# Patient Record
Sex: MALE | Race: WHITE | Employment: OTHER | URBAN - METROPOLITAN AREA
[De-identification: names, ages, dates, MRNs, and addresses within clinical notes are randomized per-mention and may not be internally consistent; named-entity substitution may affect disease eponyms.]

---

## 2017-03-17 RX ORDER — METOPROLOL TARTRATE 50 MG/1
TABLET, FILM COATED ORAL
Qty: 180 TABLET | Refills: 2 | Status: SHIPPED | OUTPATIENT
Start: 2017-03-17 | End: 2017-10-13 | Stop reason: SDUPTHER

## 2017-03-17 RX ORDER — LISINOPRIL 10 MG/1
TABLET ORAL
Qty: 90 TABLET | Refills: 2 | Status: SHIPPED | OUTPATIENT
Start: 2017-03-17 | End: 2018-04-13 | Stop reason: ALTCHOICE

## 2017-03-17 RX ORDER — CITALOPRAM 20 MG/1
TABLET ORAL
Qty: 90 TABLET | Refills: 1 | Status: SHIPPED | OUTPATIENT
Start: 2017-03-17 | End: 2017-09-26 | Stop reason: SDUPTHER

## 2017-03-20 RX ORDER — ATORVASTATIN CALCIUM 10 MG/1
10 TABLET, FILM COATED ORAL DAILY
Qty: 30 TABLET | Refills: 0 | Status: SHIPPED | OUTPATIENT
Start: 2017-03-20 | End: 2017-04-08 | Stop reason: SDUPTHER

## 2017-04-08 ENCOUNTER — OFFICE VISIT (OUTPATIENT)
Dept: FAMILY MEDICINE CLINIC | Age: 62
End: 2017-04-08

## 2017-04-08 VITALS
RESPIRATION RATE: 16 BRPM | SYSTOLIC BLOOD PRESSURE: 128 MMHG | HEART RATE: 68 BPM | WEIGHT: 217 LBS | DIASTOLIC BLOOD PRESSURE: 86 MMHG | BODY MASS INDEX: 29.39 KG/M2 | HEIGHT: 72 IN | TEMPERATURE: 98.9 F

## 2017-04-08 DIAGNOSIS — I10 ESSENTIAL HYPERTENSION: ICD-10-CM

## 2017-04-08 DIAGNOSIS — Z12.11 SCREENING FOR COLON CANCER: ICD-10-CM

## 2017-04-08 DIAGNOSIS — I71.21 THORACIC ASCENDING AORTIC ANEURYSM: ICD-10-CM

## 2017-04-08 DIAGNOSIS — Z00.00 HEALTHCARE MAINTENANCE: ICD-10-CM

## 2017-04-08 DIAGNOSIS — F41.1 GENERALIZED ANXIETY DISORDER: ICD-10-CM

## 2017-04-08 DIAGNOSIS — E78.2 MIXED HYPERLIPIDEMIA: ICD-10-CM

## 2017-04-08 DIAGNOSIS — I71.40 ABDOMINAL AORTIC ANEURYSM (AAA) WITHOUT RUPTURE: Primary | ICD-10-CM

## 2017-04-08 LAB
CONTROL: PRESENT
HEMOCCULT STL QL: NEGATIVE

## 2017-04-08 PROCEDURE — 99396 PREV VISIT EST AGE 40-64: CPT | Performed by: FAMILY MEDICINE

## 2017-04-08 PROCEDURE — 82274 ASSAY TEST FOR BLOOD FECAL: CPT | Performed by: FAMILY MEDICINE

## 2017-04-08 RX ORDER — ATORVASTATIN CALCIUM 10 MG/1
10 TABLET, FILM COATED ORAL DAILY
Qty: 90 TABLET | Refills: 1 | Status: SHIPPED | OUTPATIENT
Start: 2017-04-08 | End: 2017-10-13 | Stop reason: SDUPTHER

## 2017-04-08 RX ORDER — ALPRAZOLAM 0.5 MG/1
0.5 TABLET ORAL 2 TIMES DAILY PRN
Qty: 40 TABLET | Refills: 2 | Status: SHIPPED | OUTPATIENT
Start: 2017-04-08 | End: 2017-10-13 | Stop reason: SDUPTHER

## 2017-09-22 ENCOUNTER — HOSPITAL ENCOUNTER (OUTPATIENT)
Dept: CT IMAGING | Age: 62
Discharge: HOME | End: 2017-09-22
Payer: COMMERCIAL

## 2017-09-22 VITALS — DIASTOLIC BLOOD PRESSURE: 81 MMHG | HEART RATE: 54 BPM | SYSTOLIC BLOOD PRESSURE: 138 MMHG

## 2017-09-22 DIAGNOSIS — I71.21 THORACIC ASCENDING AORTIC ANEURYSM: ICD-10-CM

## 2017-09-22 DIAGNOSIS — I71.40 ABDOMINAL AORTIC ANEURYSM (AAA) WITHOUT RUPTURE: ICD-10-CM

## 2017-09-22 PROCEDURE — 71260 CT THORAX DX C+: CPT

## 2017-09-22 PROCEDURE — 6360000004 HC RX CONTRAST MEDICATION: Performed by: FAMILY MEDICINE

## 2017-09-22 PROCEDURE — 74177 CT ABD & PELVIS W/CONTRAST: CPT

## 2017-09-22 RX ADMIN — IOVERSOL 100 ML: 678 INJECTION INTRA-ARTERIAL; INTRAVENOUS at 08:40

## 2017-10-08 LAB
ALBUMIN SERPL-MCNC: 4.3 G/DL
ALP BLD-CCNC: 58 U/L
ALT SERPL-CCNC: 34 U/L
ANION GAP SERPL CALCULATED.3IONS-SCNC: NORMAL MMOL/L
AST SERPL-CCNC: 31 U/L
BILIRUB SERPL-MCNC: 1.1 MG/DL (ref 0.1–1.4)
BUN BLDV-MCNC: NORMAL MG/DL
CALCIUM SERPL-MCNC: 9.5 MG/DL
CHLORIDE BLD-SCNC: 105 MMOL/L
CHOLESTEROL, TOTAL: 120 MG/DL
CHOLESTEROL/HDL RATIO: 2.5
CO2: 33 MMOL/L
CREAT SERPL-MCNC: 1.12 MG/DL
GFR CALCULATED: NORMAL
GLUCOSE BLD-MCNC: 103 MG/DL
HDLC SERPL-MCNC: 48 MG/DL (ref 35–70)
LDL CHOLESTEROL CALCULATED: 54 MG/DL (ref 0–160)
POTASSIUM SERPL-SCNC: 4.7 MMOL/L
SODIUM BLD-SCNC: 142 MMOL/L
TOTAL PROTEIN: 6.9
TRIGL SERPL-MCNC: 93 MG/DL
VLDLC SERPL CALC-MCNC: NORMAL MG/DL

## 2017-10-09 DIAGNOSIS — E78.2 MIXED HYPERLIPIDEMIA: ICD-10-CM

## 2017-10-09 DIAGNOSIS — I10 ESSENTIAL HYPERTENSION: ICD-10-CM

## 2017-10-13 ENCOUNTER — OFFICE VISIT (OUTPATIENT)
Dept: FAMILY MEDICINE CLINIC | Age: 62
End: 2017-10-13

## 2017-10-13 VITALS
DIASTOLIC BLOOD PRESSURE: 82 MMHG | TEMPERATURE: 97.6 F | SYSTOLIC BLOOD PRESSURE: 136 MMHG | HEART RATE: 64 BPM | WEIGHT: 213.6 LBS | BODY MASS INDEX: 28.97 KG/M2 | RESPIRATION RATE: 14 BRPM

## 2017-10-13 DIAGNOSIS — M77.52 TENDINITIS OF LEFT FOOT: ICD-10-CM

## 2017-10-13 DIAGNOSIS — E78.2 MIXED HYPERLIPIDEMIA: ICD-10-CM

## 2017-10-13 DIAGNOSIS — I71.40 ABDOMINAL AORTIC ANEURYSM (AAA) WITHOUT RUPTURE: ICD-10-CM

## 2017-10-13 DIAGNOSIS — Z23 NEED FOR PROPHYLACTIC VACCINATION AGAINST DIPHTHERIA-TETANUS-PERTUSSIS (DTP): ICD-10-CM

## 2017-10-13 DIAGNOSIS — Z13.1 ENCOUNTER FOR SCREENING FOR DIABETES MELLITUS: ICD-10-CM

## 2017-10-13 DIAGNOSIS — I10 ESSENTIAL HYPERTENSION: Primary | ICD-10-CM

## 2017-10-13 DIAGNOSIS — I71.21 THORACIC ASCENDING AORTIC ANEURYSM: ICD-10-CM

## 2017-10-13 PROCEDURE — 90471 IMMUNIZATION ADMIN: CPT | Performed by: FAMILY MEDICINE

## 2017-10-13 PROCEDURE — 99214 OFFICE O/P EST MOD 30 MIN: CPT | Performed by: FAMILY MEDICINE

## 2017-10-13 PROCEDURE — 90715 TDAP VACCINE 7 YRS/> IM: CPT | Performed by: FAMILY MEDICINE

## 2017-10-13 PROCEDURE — 1036F TOBACCO NON-USER: CPT | Performed by: FAMILY MEDICINE

## 2017-10-13 PROCEDURE — 3017F COLORECTAL CA SCREEN DOC REV: CPT | Performed by: FAMILY MEDICINE

## 2017-10-13 PROCEDURE — G8427 DOCREV CUR MEDS BY ELIG CLIN: HCPCS | Performed by: FAMILY MEDICINE

## 2017-10-13 PROCEDURE — G8484 FLU IMMUNIZE NO ADMIN: HCPCS | Performed by: FAMILY MEDICINE

## 2017-10-13 PROCEDURE — G8419 CALC BMI OUT NRM PARAM NOF/U: HCPCS | Performed by: FAMILY MEDICINE

## 2017-10-13 RX ORDER — ATORVASTATIN CALCIUM 10 MG/1
10 TABLET, FILM COATED ORAL DAILY
Qty: 90 TABLET | Refills: 1 | Status: SHIPPED | OUTPATIENT
Start: 2017-10-13 | End: 2018-04-13 | Stop reason: SDUPTHER

## 2017-10-13 RX ORDER — LISINOPRIL AND HYDROCHLOROTHIAZIDE 12.5; 1 MG/1; MG/1
1 TABLET ORAL DAILY
Qty: 90 TABLET | Refills: 1 | Status: SHIPPED | OUTPATIENT
Start: 2017-10-13 | End: 2018-04-13 | Stop reason: SDUPTHER

## 2017-10-13 RX ORDER — LISINOPRIL 10 MG/1
TABLET ORAL
Qty: 90 TABLET | Refills: 2 | Status: CANCELLED | OUTPATIENT
Start: 2017-10-13

## 2017-10-13 RX ORDER — METOPROLOL TARTRATE 50 MG/1
TABLET, FILM COATED ORAL
Qty: 180 TABLET | Refills: 1 | Status: SHIPPED | OUTPATIENT
Start: 2017-10-13 | End: 2018-04-13 | Stop reason: SDUPTHER

## 2017-10-13 RX ORDER — ALPRAZOLAM 0.5 MG/1
0.5 TABLET ORAL 2 TIMES DAILY PRN
Qty: 40 TABLET | Refills: 2 | Status: SHIPPED | OUTPATIENT
Start: 2017-10-13 | End: 2018-04-13 | Stop reason: ALTCHOICE

## 2017-10-13 ASSESSMENT — PATIENT HEALTH QUESTIONNAIRE - PHQ9
1. LITTLE INTEREST OR PLEASURE IN DOING THINGS: 0
SUM OF ALL RESPONSES TO PHQ9 QUESTIONS 1 & 2: 0
2. FEELING DOWN, DEPRESSED OR HOPELESS: 0
SUM OF ALL RESPONSES TO PHQ QUESTIONS 1-9: 0

## 2017-10-13 NOTE — PATIENT INSTRUCTIONS
Patient Education        High Blood Pressure: Care Instructions  Your Care Instructions  If your blood pressure is usually above 140/90, you have high blood pressure, or hypertension. That means the top number is 140 or higher or the bottom number is 90 or higher, or both. Despite what a lot of people think, high blood pressure usually doesn't cause headaches or make you feel dizzy or lightheaded. It usually has no symptoms. But it does increase your risk for heart attack, stroke, and kidney or eye damage. The higher your blood pressure, the more your risk increases. Your doctor will give you a goal for your blood pressure. Your goal will be based on your health and your age. An example of a goal is to keep your blood pressure below 140/90. Lifestyle changes, such as eating healthy and being active, are always important to help lower blood pressure. You might also take medicine to reach your blood pressure goal.  Follow-up care is a key part of your treatment and safety. Be sure to make and go to all appointments, and call your doctor if you are having problems. It's also a good idea to know your test results and keep a list of the medicines you take. How can you care for yourself at home? Medical treatment  · If you stop taking your medicine, your blood pressure will go back up. You may take one or more types of medicine to lower your blood pressure. Be safe with medicines. Take your medicine exactly as prescribed. Call your doctor if you think you are having a problem with your medicine. · Talk to your doctor before you start taking aspirin every day. Aspirin can help certain people lower their risk of a heart attack or stroke. But taking aspirin isn't right for everyone, because it can cause serious bleeding. · See your doctor regularly. You may need to see the doctor more often at first or until your blood pressure comes down.   · If you are taking blood pressure medicine, talk to your doctor before you

## 2017-10-16 ENCOUNTER — TELEPHONE (OUTPATIENT)
Dept: FAMILY MEDICINE CLINIC | Age: 62
End: 2017-10-16

## 2017-10-16 NOTE — TELEPHONE ENCOUNTER
YOU HAD GIVEN THIS PATIENT A REFERRAL FOR CARDIO AND HE WANTED TO KNOW HOW SOON DOES HE NEED TO FOLLOW UP ON THIS? HE WOULD LIKE TO GO TO [facility] IF HE HAS TO DO THIS SOON.  [address] [place]    [phone] (home)

## 2017-11-27 ENCOUNTER — TELEPHONE (OUTPATIENT)
Dept: FAMILY MEDICINE CLINIC | Age: 62
End: 2017-11-27

## 2018-04-07 LAB
ALBUMIN SERPL-MCNC: 4.3 G/DL
ALP BLD-CCNC: 58 U/L
ALT SERPL-CCNC: 31 U/L
ANION GAP SERPL CALCULATED.3IONS-SCNC: NORMAL MMOL/L
AST SERPL-CCNC: 29 U/L
BASOPHILS ABSOLUTE: 0 /ΜL
BASOPHILS RELATIVE PERCENT: 0 %
BILIRUB SERPL-MCNC: 1.1 MG/DL (ref 0.1–1.4)
BUN BLDV-MCNC: 17 MG/DL
CALCIUM SERPL-MCNC: 9.6 MG/DL
CHLORIDE BLD-SCNC: 101 MMOL/L
CHOLESTEROL, TOTAL: 136 MG/DL
CHOLESTEROL/HDL RATIO: 3.1
CO2: 35 MMOL/L
CREAT SERPL-MCNC: 0.89 MG/DL
EOSINOPHILS ABSOLUTE: 100 /ΜL
EOSINOPHILS RELATIVE PERCENT: 2 %
GFR CALCULATED: 92
GLUCOSE BLD-MCNC: 105 MG/DL
HCT VFR BLD CALC: 48.8 % (ref 41–53)
HDLC SERPL-MCNC: 44 MG/DL (ref 35–70)
HEMOGLOBIN: 16.1 G/DL (ref 13.5–17.5)
LDL CHOLESTEROL CALCULATED: 71 MG/DL (ref 0–160)
LYMPHOCYTES ABSOLUTE: 1500 /ΜL
LYMPHOCYTES RELATIVE PERCENT: 21 %
MCH RBC QN AUTO: 30.1 PG
MCHC RBC AUTO-ENTMCNC: 32.9 G/DL
MCV RBC AUTO: 91.5 FL
MONOCYTES ABSOLUTE: 500 /ΜL
MONOCYTES RELATIVE PERCENT: 7 %
NEUTROPHILS ABSOLUTE: 5000 /ΜL
NEUTROPHILS RELATIVE PERCENT: 70 %
PDW BLD-RTO: 14.9 %
PLATELET # BLD: 158 K/ΜL
PMV BLD AUTO: 10.1 FL
POTASSIUM SERPL-SCNC: 4.3 MMOL/L
RBC # BLD: 5.33 10^6/ΜL
SODIUM BLD-SCNC: 139 MMOL/L
TOTAL PROTEIN: 6.9
TRIGL SERPL-MCNC: 120 MG/DL
VLDLC SERPL CALC-MCNC: NORMAL MG/DL
WBC # BLD: 7.1 10^3/ML

## 2018-04-09 DIAGNOSIS — I10 ESSENTIAL HYPERTENSION: ICD-10-CM

## 2018-04-09 DIAGNOSIS — Z13.1 ENCOUNTER FOR SCREENING FOR DIABETES MELLITUS: ICD-10-CM

## 2018-04-10 LAB — GLUCOSE FASTING: 105 MG/DL

## 2018-04-13 ENCOUNTER — OFFICE VISIT (OUTPATIENT)
Dept: FAMILY MEDICINE CLINIC | Age: 63
End: 2018-04-13
Payer: COMMERCIAL

## 2018-04-13 VITALS
BODY MASS INDEX: 27.55 KG/M2 | WEIGHT: 203.4 LBS | HEIGHT: 72 IN | RESPIRATION RATE: 18 BRPM | HEART RATE: 76 BPM | DIASTOLIC BLOOD PRESSURE: 66 MMHG | SYSTOLIC BLOOD PRESSURE: 118 MMHG | TEMPERATURE: 97.7 F

## 2018-04-13 DIAGNOSIS — E78.2 MIXED HYPERLIPIDEMIA: ICD-10-CM

## 2018-04-13 DIAGNOSIS — I10 ESSENTIAL HYPERTENSION: ICD-10-CM

## 2018-04-13 DIAGNOSIS — F41.1 GENERALIZED ANXIETY DISORDER: ICD-10-CM

## 2018-04-13 DIAGNOSIS — Z12.11 COLON CANCER SCREENING: ICD-10-CM

## 2018-04-13 DIAGNOSIS — Z00.00 HEALTHCARE MAINTENANCE: Primary | ICD-10-CM

## 2018-04-13 PROCEDURE — 99396 PREV VISIT EST AGE 40-64: CPT | Performed by: FAMILY MEDICINE

## 2018-04-13 RX ORDER — ATORVASTATIN CALCIUM 10 MG/1
10 TABLET, FILM COATED ORAL DAILY
Qty: 90 TABLET | Refills: 1 | Status: SHIPPED | OUTPATIENT
Start: 2018-04-13 | End: 2018-10-10 | Stop reason: SDUPTHER

## 2018-04-13 RX ORDER — CITALOPRAM 20 MG/1
20 TABLET ORAL DAILY
Qty: 90 TABLET | Refills: 1 | Status: SHIPPED | OUTPATIENT
Start: 2018-04-13 | End: 2018-10-06 | Stop reason: SDUPTHER

## 2018-04-13 RX ORDER — LISINOPRIL AND HYDROCHLOROTHIAZIDE 12.5; 1 MG/1; MG/1
1 TABLET ORAL DAILY
Qty: 90 TABLET | Refills: 1 | Status: SHIPPED | OUTPATIENT
Start: 2018-04-13 | End: 2018-10-06 | Stop reason: SDUPTHER

## 2018-04-13 RX ORDER — METOPROLOL TARTRATE 50 MG/1
TABLET, FILM COATED ORAL
Qty: 180 TABLET | Refills: 1 | Status: SHIPPED | OUTPATIENT
Start: 2018-04-13 | End: 2018-10-19 | Stop reason: SDUPTHER

## 2018-04-13 ASSESSMENT — PATIENT HEALTH QUESTIONNAIRE - PHQ9
SUM OF ALL RESPONSES TO PHQ QUESTIONS 1-9: 0
1. LITTLE INTEREST OR PLEASURE IN DOING THINGS: 0
2. FEELING DOWN, DEPRESSED OR HOPELESS: 0
SUM OF ALL RESPONSES TO PHQ9 QUESTIONS 1 & 2: 0

## 2018-08-30 ENCOUNTER — OFFICE VISIT (OUTPATIENT)
Dept: FAMILY MEDICINE CLINIC | Age: 63
End: 2018-08-30
Payer: COMMERCIAL

## 2018-08-30 VITALS
HEART RATE: 60 BPM | WEIGHT: 203 LBS | SYSTOLIC BLOOD PRESSURE: 124 MMHG | TEMPERATURE: 97.2 F | HEIGHT: 72 IN | BODY MASS INDEX: 27.5 KG/M2 | RESPIRATION RATE: 12 BRPM | DIASTOLIC BLOOD PRESSURE: 74 MMHG

## 2018-08-30 DIAGNOSIS — F41.1 GENERALIZED ANXIETY DISORDER: Primary | ICD-10-CM

## 2018-08-30 DIAGNOSIS — I10 ESSENTIAL HYPERTENSION: ICD-10-CM

## 2018-08-30 DIAGNOSIS — M72.2 PLANTAR FASCIITIS, LEFT: ICD-10-CM

## 2018-08-30 PROCEDURE — 1036F TOBACCO NON-USER: CPT | Performed by: FAMILY MEDICINE

## 2018-08-30 PROCEDURE — G8419 CALC BMI OUT NRM PARAM NOF/U: HCPCS | Performed by: FAMILY MEDICINE

## 2018-08-30 PROCEDURE — 99213 OFFICE O/P EST LOW 20 MIN: CPT | Performed by: FAMILY MEDICINE

## 2018-08-30 PROCEDURE — G8427 DOCREV CUR MEDS BY ELIG CLIN: HCPCS | Performed by: FAMILY MEDICINE

## 2018-08-30 PROCEDURE — 3017F COLORECTAL CA SCREEN DOC REV: CPT | Performed by: FAMILY MEDICINE

## 2018-08-30 RX ORDER — ALPRAZOLAM 0.5 MG/1
0.5 TABLET ORAL 2 TIMES DAILY PRN
Qty: 40 TABLET | Refills: 2 | Status: SHIPPED | OUTPATIENT
Start: 2018-08-30 | End: 2018-09-29

## 2018-08-30 ASSESSMENT — PATIENT HEALTH QUESTIONNAIRE - PHQ9
SUM OF ALL RESPONSES TO PHQ QUESTIONS 1-9: 0
2. FEELING DOWN, DEPRESSED OR HOPELESS: 0
SUM OF ALL RESPONSES TO PHQ9 QUESTIONS 1 & 2: 0
SUM OF ALL RESPONSES TO PHQ QUESTIONS 1-9: 0
1. LITTLE INTEREST OR PLEASURE IN DOING THINGS: 0

## 2018-08-30 NOTE — PROGRESS NOTES
fatigue, night sweats or pallor  Endocrine ROS: negative for - hot flashes, malaise/lethargy, palpitations, polydipsia/polyuria, skin changes, temperature intolerance or unexpected weight changes  Respiratory ROS: negative for - cough, hemoptysis, pleuritic pain, shortness of breath or wheezing  Cardiovascular ROS: no chest pain or dyspnea on exertion  Gastrointestinal ROS: no abdominal pain, change in bowel habits, or black or bloody stools  Genito-Urinary ROS: no dysuria, trouble voiding, or hematuria  Dermatological ROS: negative for - dry skin, lumps, pruritus or rash     Blood pressure 124/74, pulse 60, temperature 97.2 °F (36.2 °C), temperature source Temporal, resp. rate 12, height 6' (1.829 m), weight 203 lb (92.1 kg). Physical Examination: General appearance - alert, well appearing, and in no distress  Mental status - alert, oriented to person, place, and time  Eyes - pupils equal and reactive, extraocular eye movements intact  Ears - bilateral TM's and external ear canals normal  Mouth - mucous membranes moist, pharynx normal without lesions  Neck - supple, no significant adenopathy  Lymphatics - no palpable lymphadenopathy, no hepatosplenomegaly  Chest - clear to auscultation, no wheezes, rales or rhonchi, symmetric air entry  Heart - normal rate, regular rhythm, normal S1, S2, no murmurs, rubs, clicks or gallops  Abdomen - soft, nontender, nondistended, no masses or organomegaly  Neurological - alert, oriented, normal speech, no focal findings or movement disorder noted  Exam shows tenderness to the inferomedial aspect of the heel left. Office Visit on 04/13/2018   Component Date Value Ref Range Status    Glucose, Fasting 04/10/2018 105  mg/dL Final       Test results reviewed and discussed with the patient     Assessment / Plan:     Encounter Diagnoses   Name Primary?     Generalized anxiety disorder Yes    Essential hypertension     Plantar fasciitis, left        Outpatient Encounter

## 2018-09-08 LAB
ALBUMIN SERPL-MCNC: 4.1 G/DL
ALP BLD-CCNC: 58 U/L
ALT SERPL-CCNC: 36 U/L
ANION GAP SERPL CALCULATED.3IONS-SCNC: NORMAL MMOL/L
AST SERPL-CCNC: 32 U/L
BILIRUB SERPL-MCNC: 0.9 MG/DL (ref 0.1–1.4)
BUN BLDV-MCNC: NORMAL MG/DL
CALCIUM SERPL-MCNC: 9.3 MG/DL
CHLORIDE BLD-SCNC: 104 MMOL/L
CHOLESTEROL, TOTAL: 123 MG/DL
CHOLESTEROL/HDL RATIO: 2.8
CO2: 31 MMOL/L
CREAT SERPL-MCNC: 1.04 MG/DL
GFR CALCULATED: NORMAL
GLUCOSE BLD-MCNC: 89 MG/DL
HDLC SERPL-MCNC: 44 MG/DL (ref 35–70)
LDL CHOLESTEROL CALCULATED: 62 MG/DL (ref 0–160)
POTASSIUM SERPL-SCNC: 4.1 MMOL/L
SODIUM BLD-SCNC: 140 MMOL/L
TOTAL PROTEIN: 6.6
TRIGL SERPL-MCNC: 83 MG/DL
VLDLC SERPL CALC-MCNC: NORMAL MG/DL

## 2018-09-10 DIAGNOSIS — Z00.00 HEALTHCARE MAINTENANCE: ICD-10-CM

## 2018-10-10 DIAGNOSIS — F41.1 GENERALIZED ANXIETY DISORDER: ICD-10-CM

## 2018-10-10 RX ORDER — ATORVASTATIN CALCIUM 10 MG/1
10 TABLET, FILM COATED ORAL DAILY
Qty: 90 TABLET | Refills: 1 | Status: SHIPPED | OUTPATIENT
Start: 2018-10-10

## 2018-10-10 RX ORDER — CITALOPRAM 20 MG/1
20 TABLET ORAL DAILY
Qty: 90 TABLET | Refills: 0 | Status: SHIPPED | OUTPATIENT
Start: 2018-10-10 | End: 2018-10-19 | Stop reason: SDUPTHER

## 2018-10-10 RX ORDER — LISINOPRIL AND HYDROCHLOROTHIAZIDE 12.5; 1 MG/1; MG/1
1 TABLET ORAL DAILY
Qty: 90 TABLET | Refills: 0 | Status: SHIPPED | OUTPATIENT
Start: 2018-10-10 | End: 2018-10-19 | Stop reason: SDUPTHER

## 2018-10-10 NOTE — TELEPHONE ENCOUNTER
PATIENT CALLED REQUESTING REFILL.  PLEASE ADVISE     HE STATES HE IS SWITCHING HIS MEDICATIONS TO [facility]    LAST SEEN 8/30/18

## 2018-10-19 ENCOUNTER — OFFICE VISIT (OUTPATIENT)
Dept: FAMILY MEDICINE CLINIC | Age: 63
End: 2018-10-19
Payer: COMMERCIAL

## 2018-10-19 VITALS
HEIGHT: 72 IN | TEMPERATURE: 97.1 F | SYSTOLIC BLOOD PRESSURE: 124 MMHG | HEART RATE: 68 BPM | RESPIRATION RATE: 18 BRPM | BODY MASS INDEX: 27.82 KG/M2 | DIASTOLIC BLOOD PRESSURE: 76 MMHG | WEIGHT: 205.4 LBS

## 2018-10-19 DIAGNOSIS — Z95.828 HISTORY OF ENDOVASCULAR STENT GRAFT FOR ABDOMINAL AORTIC ANEURYSM (AAA): ICD-10-CM

## 2018-10-19 DIAGNOSIS — F41.1 GENERALIZED ANXIETY DISORDER: ICD-10-CM

## 2018-10-19 DIAGNOSIS — E78.2 MIXED HYPERLIPIDEMIA: ICD-10-CM

## 2018-10-19 DIAGNOSIS — I10 ESSENTIAL HYPERTENSION: Primary | ICD-10-CM

## 2018-10-19 PROCEDURE — G8427 DOCREV CUR MEDS BY ELIG CLIN: HCPCS | Performed by: FAMILY MEDICINE

## 2018-10-19 PROCEDURE — G8484 FLU IMMUNIZE NO ADMIN: HCPCS | Performed by: FAMILY MEDICINE

## 2018-10-19 PROCEDURE — G8419 CALC BMI OUT NRM PARAM NOF/U: HCPCS | Performed by: FAMILY MEDICINE

## 2018-10-19 PROCEDURE — 1036F TOBACCO NON-USER: CPT | Performed by: FAMILY MEDICINE

## 2018-10-19 PROCEDURE — 99214 OFFICE O/P EST MOD 30 MIN: CPT | Performed by: FAMILY MEDICINE

## 2018-10-19 PROCEDURE — 3017F COLORECTAL CA SCREEN DOC REV: CPT | Performed by: FAMILY MEDICINE

## 2018-10-19 RX ORDER — LISINOPRIL AND HYDROCHLOROTHIAZIDE 12.5; 1 MG/1; MG/1
1 TABLET ORAL DAILY
Qty: 90 TABLET | Refills: 1 | Status: SHIPPED | OUTPATIENT
Start: 2018-10-19

## 2018-10-19 RX ORDER — CITALOPRAM 20 MG/1
20 TABLET ORAL DAILY
Qty: 90 TABLET | Refills: 1 | Status: SHIPPED | OUTPATIENT
Start: 2018-10-19

## 2018-10-19 RX ORDER — METOPROLOL TARTRATE 50 MG/1
TABLET, FILM COATED ORAL
Qty: 180 TABLET | Refills: 1 | Status: SHIPPED | OUTPATIENT
Start: 2018-10-19

## 2018-10-19 RX ORDER — CLOPIDOGREL BISULFATE 75 MG/1
75 TABLET ORAL DAILY
COMMUNITY

## 2018-10-19 ASSESSMENT — PATIENT HEALTH QUESTIONNAIRE - PHQ9
SUM OF ALL RESPONSES TO PHQ QUESTIONS 1-9: 0
SUM OF ALL RESPONSES TO PHQ QUESTIONS 1-9: 0
2. FEELING DOWN, DEPRESSED OR HOPELESS: 0
SUM OF ALL RESPONSES TO PHQ9 QUESTIONS 1 & 2: 0
1. LITTLE INTEREST OR PLEASURE IN DOING THINGS: 0

## 2018-10-19 NOTE — PROGRESS NOTES
facility-administered medications for this visit. No Known Allergies    Social History   Substance Use Topics    Smoking status: Never Smoker    Smokeless tobacco: Never Used    Alcohol use 1.5 oz/week     3 drink(s) per week       Review of Systems - General ROS: negative for - fatigue, fever, malaise, night sweats, sleep disturbance or weight loss  Psychological ROS: negative for - anxiety, concentration difficulties, depression, memory difficulties or sleep disturbances  ENT ROS: negative for - hearing change, nasal discharge, oral lesions, sinus pain, sore throat, tinnitus or vertigo  Allergy and Immunology ROS: negative for - hives, nasal congestion or seasonal allergies  Hematological and Lymphatic ROS: negative for - bruising, fatigue, night sweats or pallor  Endocrine ROS: negative for - hot flashes, malaise/lethargy, palpitations, polydipsia/polyuria, skin changes, temperature intolerance or unexpected weight changes  Respiratory ROS: negative for - cough, hemoptysis, pleuritic pain, shortness of breath or wheezing  Cardiovascular ROS: no chest pain or dyspnea on exertion  Gastrointestinal ROS: no abdominal pain, change in bowel habits, or black or bloody stools  Genito-Urinary ROS: no dysuria, trouble voiding, or hematuria  Musculoskeletal ROS: negative for - joint pain, joint stiffness, joint swelling, muscle pain or muscular weakness  Neurological ROS: negative for - dizziness, gait disturbance, headaches, impaired coordination/balance, numbness/tingling, tremors or visual changes  Dermatological ROS: negative for - dry skin, lumps, pruritus or rash            Blood pressure 124/76, pulse 68, temperature 97.1 °F (36.2 °C), temperature source Temporal, resp. rate 18, height 6' (1.829 m), weight 205 lb 6.4 oz (93.2 kg).     Physical Examination: General appearance - alert, well appearing, and in no distress  Mental status - alert, oriented to person, place, and time  Eyes - pupils equal and reactive, extraocular eye movements intact  Ears - bilateral TM's and external ear canals normal  Mouth - mucous membranes moist, pharynx normal without lesions  Neck - supple, no significant adenopathy  Lymphatics - no palpable lymphadenopathy, no hepatosplenomegaly  Chest - clear to auscultation, no wheezes, rales or rhonchi, symmetric air entry  Heart - normal rate, regular rhythm, normal S1, S2, no murmurs, rubs, clicks or gallops  Abdomen - soft, nontender, nondistended, no masses or organomegaly  Neurological - alert, oriented, normal speech, no focal findings or movement disorder noted  Musculoskeletal - no joint tenderness, deformity or swelling        Orders Only on 09/14/2018   Component Date Value Ref Range Status    WBC 09/28/2018 6.2  4.2 - 11.0 10*3/uL Final    RBC 09/28/2018 5.32  4.08 - 6.37 10*6/uL Final    Hemoglobin 09/28/2018 16.0  12.8 - 17.7 g/dL Final    Hematocrit 09/28/2018 47.6  38.4 - 54.9 % Final    MCV 09/28/2018 89.5  83.3 - 98.2 fL Final    MCH 09/28/2018 30.1  27.5 - 32.9 pg Final    MCHC 09/28/2018 33.6  30.5 - 35.4 g/dL Final    RDW-CV 09/28/2018 13.4  12.0 - 15.4 % Final    RDW-SD 09/28/2018 43.9  39.3 - 48.6 fL Final    Platelets 94/04/5460 146* 155 - 404 10*3/uL Final    MPV 09/28/2018 10.6  9.9 - 12.1 fL Final    Nucleated RBCs 09/28/2018 0.0  /100[WBCs] Final    RBCs Counted 09/28/2018 0.00  10*3/uL Final    Glucose 09/28/2018 91  70 - 100 mg/dL Final    Urea Nitrogen 09/28/2018 18  6 - 23 mg/dL Final    CREATININE 09/28/2018 1.08  0.50 - 1.30 mg/dL Final    Gfr Calculated 09/28/2018 >60   Final    Sodium 09/28/2018 139  136 - 145 mmol/L Final    Potassium 09/28/2018 4.7  3.5 - 5.1 mmol/L Final    Chloride 09/28/2018 102  98 - 107 mmol/L Final    HCO3 09/28/2018 33* 21 - 32 mmol/L Final    Calcium 09/28/2018 9.9  8.6 - 10.3 mg/dL Final    Anion Gap 09/28/2018 9* 10 - 20 mmol/L Final    BUN/Creatinine Ratio 09/28/2018 17  5 - 25 Final    WBC 10/12/2018

## 2018-12-14 DIAGNOSIS — Z12.11 COLON CANCER SCREENING: ICD-10-CM

## 2018-12-14 LAB
CONTROL: NORMAL
HEMOCCULT STL QL: NEGATIVE

## 2018-12-14 PROCEDURE — 82274 ASSAY TEST FOR BLOOD FECAL: CPT | Performed by: FAMILY MEDICINE

## 2019-03-07 ENCOUNTER — TELEPHONE (OUTPATIENT)
Dept: FAMILY MEDICINE CLINIC | Age: 64
End: 2019-03-07

## 2019-04-17 LAB
ALBUMIN SERPL-MCNC: 4.1 G/DL
ALP BLD-CCNC: 54 U/L
ALT SERPL-CCNC: 32 U/L
ANION GAP SERPL CALCULATED.3IONS-SCNC: NORMAL MMOL/L
AST SERPL-CCNC: 28 U/L
BILIRUB SERPL-MCNC: 1 MG/DL (ref 0.1–1.4)
BUN BLDV-MCNC: 22 MG/DL
CALCIUM SERPL-MCNC: 9.4 MG/DL
CHLORIDE BLD-SCNC: 102 MMOL/L
CHOLESTEROL, TOTAL: 111 MG/DL
CHOLESTEROL/HDL RATIO: 2.7
CO2: 31 MMOL/L
CREAT SERPL-MCNC: 1.06 MG/DL
GFR CALCULATED: NORMAL
GLUCOSE BLD-MCNC: 81 MG/DL
HDLC SERPL-MCNC: 41 MG/DL (ref 35–70)
LDL CHOLESTEROL CALCULATED: 45 MG/DL (ref 0–160)
POTASSIUM SERPL-SCNC: 4.1 MMOL/L
SODIUM BLD-SCNC: 139 MMOL/L
TOTAL PROTEIN: 6.5
TRIGL SERPL-MCNC: 167 MG/DL
VLDLC SERPL CALC-MCNC: NORMAL MG/DL

## 2019-04-24 DIAGNOSIS — E78.2 MIXED HYPERLIPIDEMIA: ICD-10-CM

## 2019-04-24 DIAGNOSIS — I10 ESSENTIAL HYPERTENSION: ICD-10-CM

## 2025-05-11 NOTE — PROGRESS NOTES
hematuria  Musculoskeletal ROS: negative for - joint pain, joint stiffness, joint swelling, muscle pain or muscular weakness  Neurological ROS: negative for - dizziness, gait disturbance, headaches, impaired coordination/balance, numbness/tingling, tremors or visual changes  Dermatological ROS: negative for - dry skin, lumps, pruritus or rash            Blood pressure 136/82, pulse 64, temperature 97.6 °F (36.4 °C), temperature source Temporal, resp. rate 14, weight 213 lb 9.6 oz (96.9 kg).     Physical Examination: General appearance - alert, well appearing, and in no distress  Mental status - alert, oriented to person, place, and time  Eyes - pupils equal and reactive, extraocular eye movements intact  Ears - bilateral TM's and external ear canals normal  Mouth - mucous membranes moist, pharynx normal without lesions  Neck - supple, no significant adenopathy  Lymphatics - no palpable lymphadenopathy, no hepatosplenomegaly  Chest - clear to auscultation, no wheezes, rales or rhonchi, symmetric air entry  Heart - normal rate, regular rhythm, normal S1, S2, no murmurs, rubs, clicks or gallops  Abdomen - soft, nontender, nondistended, no masses or organomegaly  Neurological - alert, oriented, normal speech, no focal findings or movement disorder noted  Musculoskeletal - as noted in HPI           Orders Only on 10/09/2017   Component Date Value Ref Range Status    Sodium 10/08/2017 142  mmol/L Final    Chloride 10/08/2017 105  mmol/L Final    Potassium 10/08/2017 4.7  mmol/L Final    CREATININE 10/08/2017 1.12   Final    Glucose 10/08/2017 103  mg/dL Final    AST 10/08/2017 31  U/L Final    ALT 10/08/2017 34  U/L Final    Calcium 10/08/2017 9.5  mg/dL Final    Total Protein 10/08/2017 6.9   Final    CO2 10/08/2017 33  mmol/L Final    Alb 10/08/2017 4.3   Final    Alkaline Phosphatase 10/08/2017 58  U/L Final    Total Bilirubin 10/08/2017 1.1  0.1 - 1.4 mg/dL Final    Cholesterol, Total 10/08/2017 120 mg/dL Final    HDL 10/08/2017 48  35 - 70 mg/dL Final    LDL Calculated 10/08/2017 54  0 - 160 mg/dL Final    Triglycerides 10/08/2017 93  mg/dL Final    Chol/HDL Ratio 10/08/2017 2.5   Final            Encounter Diagnoses   Name Primary?     Encounter for screening for diabetes mellitus     Need for prophylactic vaccination against diphtheria-tetanus-pertussis (DTP)     Abdominal aortic aneurysm (AAA) without rupture ([facility] [address]     Thoracic ascending aortic aneurysm (HCC)     Mixed hyperlipidemia     Essential hypertension Yes    Tendinitis of left foot             Plan:     Orders Placed This Encounter   Procedures    Tdap (age 10y-63y) IM (Adacel)    Glucose, Fasting     Standing Status:   Future     Standing Expiration Date:   10/13/2018    Comprehensive Metabolic Panel     Standing Status:   Future     Standing Expiration Date:   10/13/2018    Lipid Panel     Standing Status:   Future     Standing Expiration Date:   10/13/2018     Order Specific Question:   Is Patient Fasting?/# of Hours     Answer:   yes 9-12 hrs    CBC With Auto Differential     Standing Status:   Future     Standing Expiration Date:   10/13/2018    Ambulatory referral to Cardiology     Referral Priority:   Routine     Referral Type:   Consult for Advice and Opinion     Referral Reason:   Specialty Services Required     Referred to Provider:   Anita Ferguson DO     Requested Specialty:   Cardiology     Number of Visits Requested:   1    Amb External Referral To Podiatry     Referral Priority:   Routine     Referral Type:   Consult for Advice and Opinion     Referral Reason:   Specialty Services Required     Requested Specialty:   Podiatry     Number of Visits Requested:   1     Outpatient Encounter Prescriptions as of 10/13/2017   Medication Sig Dispense Refill    atorvastatin (LIPITOR) 10 MG tablet Take 1 tablet by mouth daily 90 tablet 1    ALPRAZolam (XANAX) 0.5 MG tablet Take 1 tablet by mouth 2 times daily as needed for Sleep or Anxiety 40 tablet 2    metoprolol tartrate (LOPRESSOR) 50 MG tablet Take 1 tablet by mouth 2 times daily 180 tablet 1    lisinopril-hydrochlorothiazide (PRINZIDE;ZESTORETIC) 10-12.5 MG per tablet Take 1 tablet by mouth daily 90 tablet 1    citalopram (CELEXA) 20 MG tablet Take 1 tablet by mouth daily 90 tablet 1    [DISCONTINUED] atorvastatin (LIPITOR) 10 MG tablet Take 1 tablet by mouth daily 90 tablet 1    [DISCONTINUED] ALPRAZolam (XANAX) 0.5 MG tablet Take 1 tablet by mouth 2 times daily as needed for Sleep or Anxiety 40 tablet 2    lisinopril (PRINIVIL;ZESTRIL) 10 MG tablet Take 1 tablet by mouth daily 90 tablet 2    [DISCONTINUED] metoprolol tartrate (LOPRESSOR) 50 MG tablet Take 1 tablet by mouth 2 times daily 180 tablet 2    Cholecalciferol (VITAMIN D-3) 5000 UNITS TABS Take  by mouth daily.  b complex vitamins capsule Take 1 capsule by mouth daily.  aspirin 81 MG tablet Take 81 mg by mouth daily.  Ascorbic Acid (VITAMIN C) 500 MG tablet Take 500 mg by mouth 2 times daily.  Omega-3 Fatty Acids (FISH OIL) 1000 MG CAPS Take 1,000 mg by mouth daily. No facility-administered encounter medications on file as of 10/13/2017. The nature of cardiac risk has been fully discussed with this patient. I have made him aware of his LDL target goal given his cardiovascular risk analysis. I have discussed the appropriate diet. The need for lifelong compliance in order to reduce risk is stressed. A regular exercise program is recommended to help achieve and maintain normal body weight, fitness and improve lipid balance. A written copy of a low fat, low cholesterol diet has been given to the patient. Return in about 6 months (around 4/13/2018). Controlled Substances Monitoring:     Attestation: The Prescription Monitoring Report for this patient was reviewed today.  Blayne Vivas MD)  Documentation: Possible medication side effects, risk of tolerance and/or dependence, and alternative treatments discussed., Obtaining appropriate analgesic effect of treatment., No signs of potential drug abuse or diversion identified.  Edd Weems MD) Vaccine status unknown